# Patient Record
Sex: FEMALE | URBAN - METROPOLITAN AREA
[De-identification: names, ages, dates, MRNs, and addresses within clinical notes are randomized per-mention and may not be internally consistent; named-entity substitution may affect disease eponyms.]

---

## 2019-10-30 ENCOUNTER — TELEPHONE (OUTPATIENT)
Dept: FAMILY MEDICINE | Facility: CLINIC | Age: 79
End: 2019-10-30

## 2019-10-30 NOTE — TELEPHONE ENCOUNTER
Idalia from [facility] stated that the orders have been sent to [facility] and patient should receive DME in the next few days.

## 2019-10-30 NOTE — TELEPHONE ENCOUNTER
----- Message from [employer] sent at 10/30/2019  9:05 AM CDT -----  Contact: pt   She's calling in regards to orders ; hospital  bed  And        pls call pt back at [phone] (home)

## 2019-10-31 ENCOUNTER — EXTERNAL HOME HEALTH (OUTPATIENT)
Dept: HOME HEALTH SERVICES | Facility: HOSPITAL | Age: 79
End: 2019-10-31
Payer: MEDICARE

## 2019-11-11 ENCOUNTER — TELEPHONE (OUTPATIENT)
Dept: HOME HEALTH SERVICES | Facility: HOSPITAL | Age: 79
End: 2019-11-11

## 2019-12-10 ENCOUNTER — TELEPHONE (OUTPATIENT)
Dept: FAMILY MEDICINE | Facility: CLINIC | Age: 79
End: 2019-12-10

## 2019-12-12 NOTE — TELEPHONE ENCOUNTER
Patient is in [facility] room 312, she said she feels like she is being taken care of but if she needs to she will have someone bring her in to see you. It is unknown when or if she will be getting out.

## 2019-12-31 PROBLEM — I10 ESSENTIAL HYPERTENSION: Status: ACTIVE | Noted: 2019-04-01

## 2019-12-31 PROBLEM — Q21.10 ATRIAL SEPTAL DEFECT: Status: ACTIVE | Noted: 2019-04-01

## 2019-12-31 PROBLEM — J96.11 CHRONIC RESPIRATORY FAILURE WITH HYPOXIA: Status: ACTIVE | Noted: 2019-04-01

## 2019-12-31 PROBLEM — J41.0 SIMPLE CHRONIC BRONCHITIS: Status: ACTIVE | Noted: 2019-04-01

## 2019-12-31 PROBLEM — I48.20 CHRONIC ATRIAL FIBRILLATION: Status: ACTIVE | Noted: 2019-04-01

## 2019-12-31 PROBLEM — Z78.0 POSTMENOPAUSAL: Status: ACTIVE | Noted: 2019-04-01

## 2019-12-31 PROBLEM — I27.20 PULMONARY HYPERTENSION: Status: ACTIVE | Noted: 2019-12-31

## 2019-12-31 PROBLEM — Z91.09 ENVIRONMENTAL ALLERGIES: Status: ACTIVE | Noted: 2019-04-01

## 2019-12-31 PROBLEM — E66.9 OBESITY (BMI 30-39.9): Status: ACTIVE | Noted: 2019-07-01

## 2019-12-31 PROBLEM — D50.9 MICROCYTIC ANEMIA: Status: ACTIVE | Noted: 2019-11-28

## 2019-12-31 PROBLEM — D50.0 IRON DEFICIENCY ANEMIA DUE TO CHRONIC BLOOD LOSS: Status: ACTIVE | Noted: 2019-09-16

## 2019-12-31 PROBLEM — E05.90 HYPERTHYROIDISM: Status: ACTIVE | Noted: 2019-04-01

## 2019-12-31 PROBLEM — Z79.899 LONG TERM CURRENT USE OF DIURETIC: Status: ACTIVE | Noted: 2019-07-01

## 2019-12-31 PROBLEM — I50.32 CHRONIC DIASTOLIC CONGESTIVE HEART FAILURE: Status: ACTIVE | Noted: 2019-04-01

## 2019-12-31 PROBLEM — E11.9 TYPE 2 DIABETES MELLITUS WITHOUT COMPLICATION, WITHOUT LONG-TERM CURRENT USE OF INSULIN: Status: ACTIVE | Noted: 2019-04-01

## 2019-12-31 RX ORDER — FLUTICASONE PROPIONATE 50 MCG
1 SPRAY, SUSPENSION (ML) NASAL DAILY
COMMUNITY

## 2019-12-31 RX ORDER — LOSARTAN POTASSIUM 25 MG/1
25 TABLET ORAL DAILY
COMMUNITY

## 2019-12-31 RX ORDER — METHIMAZOLE 5 MG/1
5 TABLET ORAL
COMMUNITY

## 2019-12-31 RX ORDER — LEVALBUTEROL 1.25 MG/.5ML
1.25 SOLUTION, CONCENTRATE RESPIRATORY (INHALATION) EVERY 6 HOURS PRN
COMMUNITY
Start: 2019-12-09

## 2019-12-31 RX ORDER — ASPIRIN 81 MG/1
81 TABLET ORAL DAILY
COMMUNITY

## 2019-12-31 RX ORDER — LACTULOSE 10 G/15ML
20 SOLUTION ORAL; RECTAL DAILY PRN
COMMUNITY

## 2019-12-31 RX ORDER — MONTELUKAST SODIUM 10 MG/1
10 TABLET ORAL DAILY
COMMUNITY

## 2019-12-31 RX ORDER — WARFARIN 7.5 MG/1
3.75 TABLET ORAL
COMMUNITY
Start: 2019-12-09

## 2019-12-31 RX ORDER — METOPROLOL SUCCINATE 100 MG/1
100 TABLET, EXTENDED RELEASE ORAL 2 TIMES DAILY
COMMUNITY

## 2019-12-31 RX ORDER — ACETAMINOPHEN 500 MG
1 TABLET ORAL DAILY
COMMUNITY

## 2019-12-31 RX ORDER — DEXTROMETHORPHAN HYDROBROMIDE, GUAIFENESIN 5; 100 MG/5ML; MG/5ML
650 LIQUID ORAL
COMMUNITY

## 2019-12-31 RX ORDER — LEVALBUTEROL TARTRATE 45 UG/1
1-2 AEROSOL, METERED ORAL
COMMUNITY

## 2019-12-31 RX ORDER — GLIMEPIRIDE 1 MG/1
1 TABLET ORAL DAILY
COMMUNITY

## 2019-12-31 RX ORDER — SENNOSIDES 8.6 MG/1
1 TABLET ORAL DAILY PRN
COMMUNITY

## 2019-12-31 RX ORDER — FLUTICASONE PROPIONATE AND SALMETEROL 250; 50 UG/1; UG/1
1 POWDER RESPIRATORY (INHALATION) 2 TIMES DAILY
COMMUNITY

## 2019-12-31 RX ORDER — CETIRIZINE HYDROCHLORIDE 10 MG/1
10 TABLET ORAL DAILY
COMMUNITY
Start: 2017-06-17

## 2019-12-31 RX ORDER — FUROSEMIDE 40 MG/1
40 TABLET ORAL 2 TIMES DAILY
COMMUNITY
Start: 2019-09-22